# Patient Record
Sex: MALE | NOT HISPANIC OR LATINO | Employment: OTHER | URBAN - METROPOLITAN AREA
[De-identification: names, ages, dates, MRNs, and addresses within clinical notes are randomized per-mention and may not be internally consistent; named-entity substitution may affect disease eponyms.]

---

## 2023-10-30 ENCOUNTER — TELEPHONE (OUTPATIENT)
Dept: PRIMARY CARE | Facility: CLINIC | Age: 43
End: 2023-10-30

## 2023-10-30 NOTE — TELEPHONE ENCOUNTER
Rx Refill Request Telephone Encounter    Name:  Edwin Devine  :  [id number]  Medication Name: Fenofibrate 48 MG        #90 Refill:1  Take 1 tablet daily  Specific Pharmacy location: [facility]  Date of last appointment: 23  Date of next appointment:   Best number to reach patient:

## 2024-02-27 ENCOUNTER — OFFICE VISIT (OUTPATIENT)
Dept: PRIMARY CARE | Facility: CLINIC | Age: 44
End: 2024-02-27
Payer: MEDICARE

## 2024-02-27 VITALS
HEIGHT: 70 IN | TEMPERATURE: 97.5 F | BODY MASS INDEX: 28.71 KG/M2 | OXYGEN SATURATION: 97 % | DIASTOLIC BLOOD PRESSURE: 92 MMHG | HEART RATE: 115 BPM | WEIGHT: 200.5 LBS | SYSTOLIC BLOOD PRESSURE: 140 MMHG

## 2024-02-27 DIAGNOSIS — E78.5 HYPERLIPIDEMIA, UNSPECIFIED HYPERLIPIDEMIA TYPE: ICD-10-CM

## 2024-02-27 DIAGNOSIS — I10 PRIMARY HYPERTENSION: ICD-10-CM

## 2024-02-27 DIAGNOSIS — E13.9 DIABETES 1.5, MANAGED AS TYPE 2 (MULTI): Primary | ICD-10-CM

## 2024-02-27 PROCEDURE — 3080F DIAST BP >= 90 MM HG: CPT | Performed by: FAMILY MEDICINE

## 2024-02-27 PROCEDURE — 3077F SYST BP >= 140 MM HG: CPT | Performed by: FAMILY MEDICINE

## 2024-02-27 PROCEDURE — 99214 OFFICE O/P EST MOD 30 MIN: CPT | Performed by: FAMILY MEDICINE

## 2024-02-27 RX ORDER — INDAPAMIDE 2.5 MG/1
2.5 TABLET ORAL EVERY MORNING
Qty: 90 TABLET | Refills: 3 | Status: SHIPPED | OUTPATIENT
Start: 2024-02-27 | End: 2025-02-26

## 2024-02-27 RX ORDER — SITAGLIPTIN AND METFORMIN HYDROCHLORIDE 1000; 50 MG/1; MG/1
1 TABLET, FILM COATED ORAL
Qty: 180 TABLET | Refills: 3 | Status: SHIPPED | OUTPATIENT
Start: 2024-02-27 | End: 2025-02-26

## 2024-02-27 RX ORDER — ATORVASTATIN CALCIUM 40 MG/1
40 TABLET, FILM COATED ORAL DAILY
Qty: 90 TABLET | Refills: 3 | Status: SHIPPED | OUTPATIENT
Start: 2024-02-27 | End: 2025-02-21

## 2024-02-27 ASSESSMENT — PATIENT HEALTH QUESTIONNAIRE - PHQ9
SUM OF ALL RESPONSES TO PHQ9 QUESTIONS 1 AND 2: 0
1. LITTLE INTEREST OR PLEASURE IN DOING THINGS: NOT AT ALL
2. FEELING DOWN, DEPRESSED OR HOPELESS: NOT AT ALL

## 2024-02-27 NOTE — PROGRESS NOTES
"Subjective   Patient ID: Edwin Choudhary is a 43 y.o. male who presents for Med Refill (Pt states he is on diabetes medications. ).    DM2, htn, hld. Due for labs and meds refill.    Med Refill         Review of Systems   All other systems reviewed and are negative.      Objective   BP (!) 140/92   Pulse (!) 115   Temp 36.4 °C (97.5 °F)   Ht 1.778 m (5' 10\")   Wt 90.9 kg (200 lb 8 oz)   SpO2 97%   BMI 28.77 kg/m²     Physical Exam  Vitals and nursing note reviewed.   Constitutional:       Appearance: Normal appearance.   HENT:      Head: Normocephalic and atraumatic.      Nose: Nose normal.      Mouth/Throat:      Mouth: Mucous membranes are moist.      Pharynx: Oropharynx is clear.   Eyes:      Extraocular Movements: Extraocular movements intact.      Conjunctiva/sclera: Conjunctivae normal.      Pupils: Pupils are equal, round, and reactive to light.   Cardiovascular:      Rate and Rhythm: Normal rate and regular rhythm.      Pulses: Normal pulses.   Pulmonary:      Effort: Pulmonary effort is normal.      Breath sounds: Normal breath sounds.   Abdominal:      General: Bowel sounds are normal.      Palpations: Abdomen is soft.   Musculoskeletal:         General: Normal range of motion.      Cervical back: Normal range of motion and neck supple.   Skin:     General: Skin is warm and dry.      Capillary Refill: Capillary refill takes less than 2 seconds.   Neurological:      General: No focal deficit present.      Mental Status: He is alert.   Psychiatric:         Mood and Affect: Mood normal.         Behavior: Behavior normal.         Thought Content: Thought content normal.         Judgment: Judgment normal.         Assessment/Plan          "

## 2024-08-27 ENCOUNTER — APPOINTMENT (OUTPATIENT)
Dept: PRIMARY CARE | Facility: CLINIC | Age: 44
End: 2024-08-27
Payer: MEDICARE

## 2024-12-19 ENCOUNTER — APPOINTMENT (OUTPATIENT)
Dept: PRIMARY CARE | Facility: CLINIC | Age: 44
End: 2024-12-19
Payer: COMMERCIAL

## 2024-12-19 VITALS
DIASTOLIC BLOOD PRESSURE: 80 MMHG | OXYGEN SATURATION: 96 % | RESPIRATION RATE: 25 BRPM | SYSTOLIC BLOOD PRESSURE: 116 MMHG | HEIGHT: 70 IN | BODY MASS INDEX: 28.29 KG/M2 | WEIGHT: 197.6 LBS | HEART RATE: 89 BPM

## 2024-12-19 DIAGNOSIS — E11.9 TYPE 2 DIABETES MELLITUS WITHOUT COMPLICATION, WITHOUT LONG-TERM CURRENT USE OF INSULIN (MULTI): Primary | ICD-10-CM

## 2024-12-19 PROCEDURE — 3074F SYST BP LT 130 MM HG: CPT | Performed by: INTERNAL MEDICINE

## 2024-12-19 PROCEDURE — 99214 OFFICE O/P EST MOD 30 MIN: CPT | Performed by: INTERNAL MEDICINE

## 2024-12-19 PROCEDURE — 3079F DIAST BP 80-89 MM HG: CPT | Performed by: INTERNAL MEDICINE

## 2024-12-19 PROCEDURE — 3008F BODY MASS INDEX DOCD: CPT | Performed by: INTERNAL MEDICINE

## 2024-12-19 ASSESSMENT — ENCOUNTER SYMPTOMS
DIZZINESS: 0
BLOOD IN STOOL: 0
HEADACHES: 0
SLEEP DISTURBANCE: 0
SHORTNESS OF BREATH: 0
FATIGUE: 0
CONSTIPATION: 0

## 2024-12-19 ASSESSMENT — PATIENT HEALTH QUESTIONNAIRE - PHQ9
1. LITTLE INTEREST OR PLEASURE IN DOING THINGS: NOT AT ALL
SUM OF ALL RESPONSES TO PHQ9 QUESTIONS 1 AND 2: 0
2. FEELING DOWN, DEPRESSED OR HOPELESS: NOT AT ALL

## 2024-12-19 NOTE — PROGRESS NOTES
"Subjective   Patient ID: Edwin Devine is a 44 y.o. male who presents for Establish Care.    Pt presents to get established from Dr. Hernandez's office.    PMH:  -DM2: Taking janumet. A1c 9.5% on 5/2024.  -HLD: Taking atorvastatin.  -Schizophrenia: Seen at [facility]. Gets invega.        Review of Systems   Constitutional:  Negative for fatigue.   Respiratory:  Negative for shortness of breath.    Cardiovascular:  Negative for chest pain.   Gastrointestinal:  Negative for blood in stool and constipation.   Neurological:  Negative for dizziness and headaches.   Psychiatric/Behavioral:  Negative for sleep disturbance.        /80 (BP Location: Right arm, Patient Position: Sitting)   Pulse 89   Resp 25   Ht 1.778 m (5' 10\")   Wt 89.6 kg (197 lb 9.6 oz)   SpO2 96%   BMI 28.35 kg/m²   Objective   Physical Exam  Constitutional:       General: He is not in acute distress.     Appearance: He is not ill-appearing, toxic-appearing or diaphoretic.   HENT:      Head: Normocephalic and atraumatic.   Eyes:      General: No scleral icterus.     Conjunctiva/sclera: Conjunctivae normal.   Cardiovascular:      Rate and Rhythm: Normal rate and regular rhythm.      Heart sounds: No murmur heard.     No friction rub. No gallop.   Pulmonary:      Effort: Pulmonary effort is normal. No respiratory distress.      Breath sounds: No stridor. No wheezing, rhonchi or rales.   Abdominal:      General: Abdomen is flat. Bowel sounds are normal. There is no distension.      Palpations: Abdomen is soft.      Tenderness: There is no abdominal tenderness. There is no guarding.   Musculoskeletal:      Cervical back: Normal range of motion and neck supple. No tenderness.   Lymphadenopathy:      Cervical: No cervical adenopathy.   Skin:     General: Skin is warm and dry.   Neurological:      Mental Status: He is alert.         Assessment/Plan   Problem List Items Addressed This Visit             ICD-10-CM    Type 2 diabetes mellitus without " complication, without long-term current use of insulin (Multi) - Primary E11.9     -Pt taking janumet for 3 years. A1c 9.5% on 5/2024.  -Will repeat lvl, but reviewed w/ pt we likely need to add a new medication. Pt disappointed as he would rather take less medication. Reviewed he needs to make dietary changes if that's the case as I can see A1c was better controlled in the last 2 years. Discussed reducing sugar/carb intake.  -Discussed adding an SGLT-2 inhibitor. Pt agreeable after reviewing side effects. Jardiance sent to pharmacy. Pt to let us know if its too expensive. May need to try another or refer to pharmacy if that's the case.         Relevant Medications    empagliflozin (Jardiance) 25 mg    Other Relevant Orders    Hemoglobin A1c    Lipid panel    Comprehensive metabolic panel    CBC    Albumin-Creatinine Ratio, Random Urine    Tsh With Reflex To Free T4 If Abnormal            Roverto Mccollum MD 12/19/24 9:58 AM

## 2024-12-19 NOTE — ASSESSMENT & PLAN NOTE
-Pt taking janumet for 3 years. A1c 9.5% on 5/2024.  -Will repeat lvl, but reviewed w/ pt we likely need to add a new medication. Pt disappointed as he would rather take less medication. Reviewed he needs to make dietary changes if that's the case as I can see A1c was better controlled in the last 2 years. Discussed reducing sugar/carb intake.  -Discussed adding an SGLT-2 inhibitor. Pt agreeable after reviewing side effects. Jardiance sent to pharmacy. Pt to let us know if its too expensive. May need to try another or refer to pharmacy if that's the case.

## 2025-03-06 DIAGNOSIS — E13.9 DIABETES 1.5, MANAGED AS TYPE 2 (MULTI): ICD-10-CM

## 2025-03-06 DIAGNOSIS — E78.5 HYPERLIPIDEMIA, UNSPECIFIED HYPERLIPIDEMIA TYPE: ICD-10-CM

## 2025-03-06 DIAGNOSIS — I10 PRIMARY HYPERTENSION: ICD-10-CM

## 2025-03-06 RX ORDER — SITAGLIPTIN AND METFORMIN HYDROCHLORIDE 1000; 50 MG/1; MG/1
1 TABLET, FILM COATED ORAL
Qty: 180 TABLET | Refills: 3 | Status: SHIPPED | OUTPATIENT
Start: 2025-03-06 | End: 2026-03-06

## 2025-03-06 RX ORDER — ATORVASTATIN CALCIUM 40 MG/1
40 TABLET, FILM COATED ORAL DAILY
Qty: 90 TABLET | Refills: 3 | Status: SHIPPED | OUTPATIENT
Start: 2025-03-06 | End: 2026-03-01

## 2025-03-06 RX ORDER — INDAPAMIDE 2.5 MG/1
2.5 TABLET ORAL EVERY MORNING
Qty: 90 TABLET | Refills: 3 | Status: SHIPPED | OUTPATIENT
Start: 2025-03-06 | End: 2026-03-06

## 2025-03-06 RX ORDER — PALIPERIDONE 1.5 MG/1
1 TABLET, EXTENDED RELEASE ORAL NIGHTLY
COMMUNITY
Start: 2025-01-21

## 2025-05-12 ENCOUNTER — APPOINTMENT (OUTPATIENT)
Dept: PRIMARY CARE | Facility: CLINIC | Age: 45
End: 2025-05-12
Payer: COMMERCIAL